# Patient Record
Sex: FEMALE | Race: WHITE | NOT HISPANIC OR LATINO | URBAN - METROPOLITAN AREA
[De-identification: names, ages, dates, MRNs, and addresses within clinical notes are randomized per-mention and may not be internally consistent; named-entity substitution may affect disease eponyms.]

---

## 2019-09-11 ENCOUNTER — APPOINTMENT (RX ONLY)
Dept: URBAN - METROPOLITAN AREA CLINIC 84 | Facility: CLINIC | Age: 84
Setting detail: DERMATOLOGY
End: 2019-09-11

## 2019-09-11 VITALS — WEIGHT: 132 LBS

## 2019-09-11 DIAGNOSIS — L0391 CELLULITIS AND ABSCESS OF UNSPECIFIED SITES: ICD-10-CM

## 2019-09-11 DIAGNOSIS — L21.8 OTHER SEBORRHEIC DERMATITIS: ICD-10-CM

## 2019-09-11 DIAGNOSIS — L0390 CELLULITIS AND ABSCESS OF UNSPECIFIED SITES: ICD-10-CM

## 2019-09-11 PROBLEM — E78.5 HYPERLIPIDEMIA, UNSPECIFIED: Status: ACTIVE | Noted: 2019-09-11

## 2019-09-11 PROBLEM — I10 ESSENTIAL (PRIMARY) HYPERTENSION: Status: ACTIVE | Noted: 2019-09-11

## 2019-09-11 PROBLEM — L02.411 CUTANEOUS ABSCESS OF RIGHT AXILLA: Status: ACTIVE | Noted: 2019-09-11

## 2019-09-11 PROCEDURE — ? COUNSELING

## 2019-09-11 PROCEDURE — ? INTRALESIONAL KENALOG

## 2019-09-11 PROCEDURE — 11900 INJECT SKIN LESIONS </W 7: CPT

## 2019-09-11 PROCEDURE — ? PRESCRIPTION

## 2019-09-11 PROCEDURE — 99202 OFFICE O/P NEW SF 15 MIN: CPT | Mod: 25

## 2019-09-11 PROCEDURE — ? ADDITIONAL NOTES

## 2019-09-11 RX ORDER — KETOCONAZOLE 20.5 MG/ML
SHAMPOO, SUSPENSION TOPICAL
Qty: 1 | Refills: 11 | Status: ERX | COMMUNITY
Start: 2019-09-11

## 2019-09-11 RX ORDER — FLUOCINOLONE ACETONIDE 0.11 MG/ML
OIL TOPICAL
Qty: 1 | Refills: 11 | Status: ERX | COMMUNITY
Start: 2019-09-11

## 2019-09-11 RX ADMIN — FLUOCINOLONE ACETONIDE: 0.11 OIL TOPICAL at 00:00

## 2019-09-11 RX ADMIN — KETOCONAZOLE: 20.5 SHAMPOO, SUSPENSION TOPICAL at 00:00

## 2019-09-11 ASSESSMENT — LOCATION SIMPLE DESCRIPTION DERM
LOCATION SIMPLE: RIGHT AXILLARY VAULT
LOCATION SIMPLE: LEFT SCALP
LOCATION SIMPLE: ANTERIOR SCALP
LOCATION SIMPLE: RIGHT AXILLARY VAULT

## 2019-09-11 ASSESSMENT — LOCATION DETAILED DESCRIPTION DERM
LOCATION DETAILED: RIGHT AXILLARY VAULT
LOCATION DETAILED: LEFT MEDIAL FRONTAL SCALP
LOCATION DETAILED: MID-FRONTAL SCALP
LOCATION DETAILED: RIGHT AXILLARY VAULT

## 2019-09-11 ASSESSMENT — LOCATION ZONE DERM
LOCATION ZONE: AXILLAE
LOCATION ZONE: SCALP
LOCATION ZONE: AXILLAE
LOCATION ZONE: SCALP

## 2019-09-25 ENCOUNTER — APPOINTMENT (RX ONLY)
Dept: URBAN - METROPOLITAN AREA CLINIC 84 | Facility: CLINIC | Age: 84
Setting detail: DERMATOLOGY
End: 2019-09-25

## 2019-09-25 DIAGNOSIS — L0391 CELLULITIS AND ABSCESS OF UNSPECIFIED SITES: ICD-10-CM

## 2019-09-25 DIAGNOSIS — L0390 CELLULITIS AND ABSCESS OF UNSPECIFIED SITES: ICD-10-CM

## 2019-09-25 PROBLEM — L02.411 CUTANEOUS ABSCESS OF RIGHT AXILLA: Status: ACTIVE | Noted: 2019-09-25

## 2019-09-25 PROCEDURE — ? COUNSELING

## 2019-09-25 PROCEDURE — 99213 OFFICE O/P EST LOW 20 MIN: CPT

## 2019-09-25 ASSESSMENT — LOCATION ZONE DERM
LOCATION ZONE: AXILLAE
LOCATION ZONE: AXILLAE

## 2019-09-25 ASSESSMENT — LOCATION DETAILED DESCRIPTION DERM
LOCATION DETAILED: RIGHT AXILLARY VAULT
LOCATION DETAILED: RIGHT AXILLARY VAULT

## 2019-09-25 ASSESSMENT — LOCATION SIMPLE DESCRIPTION DERM
LOCATION SIMPLE: RIGHT AXILLARY VAULT
LOCATION SIMPLE: RIGHT AXILLARY VAULT

## 2019-09-25 ASSESSMENT — SEVERITY ASSESSMENT: SEVERITY: CLEAR

## 2020-01-01 ENCOUNTER — APPOINTMENT (RX ONLY)
Dept: URBAN - METROPOLITAN AREA CLINIC 84 | Facility: CLINIC | Age: 85
Setting detail: DERMATOLOGY
End: 2020-01-01

## 2020-01-01 DIAGNOSIS — L21.8 OTHER SEBORRHEIC DERMATITIS: ICD-10-CM

## 2020-01-01 DIAGNOSIS — L29.89 OTHER PRURITUS: ICD-10-CM

## 2020-01-01 DIAGNOSIS — L85.3 XEROSIS CUTIS: ICD-10-CM

## 2020-01-01 DIAGNOSIS — L28.0 LICHEN SIMPLEX CHRONICUS: ICD-10-CM

## 2020-01-01 DIAGNOSIS — L29.8 OTHER PRURITUS: ICD-10-CM

## 2020-01-01 PROCEDURE — 99213 OFFICE O/P EST LOW 20 MIN: CPT

## 2020-01-01 PROCEDURE — ? ADDITIONAL NOTES

## 2020-01-01 PROCEDURE — ? PRESCRIPTION

## 2020-01-01 PROCEDURE — ? COUNSELING

## 2020-01-01 RX ORDER — CLOBETASOL PROPIONATE 0.5 MG/ML
SOLUTION TOPICAL BID
Qty: 1 | Refills: 1 | Status: ERX | COMMUNITY
Start: 2020-01-01

## 2020-01-01 RX ORDER — KETOCONAZOLE 20 MG/ML
SHAMPOO, SUSPENSION TOPICAL QOD
Qty: 1 | Refills: 5 | Status: ERX

## 2020-01-01 RX ORDER — TRIAMCINOLONE ACETONIDE 1 MG/G
CREAM TOPICAL BID
Qty: 1 | Refills: 1 | Status: ERX | COMMUNITY
Start: 2020-01-01

## 2020-01-01 RX ADMIN — TRIAMCINOLONE ACETONIDE: 1 CREAM TOPICAL at 00:00

## 2020-01-01 RX ADMIN — CLOBETASOL PROPIONATE: 0.5 SOLUTION TOPICAL at 00:00

## 2020-01-01 ASSESSMENT — LOCATION SIMPLE DESCRIPTION DERM
LOCATION SIMPLE: LEFT PRETIBIAL REGION
LOCATION SIMPLE: RIGHT UPPER BACK
LOCATION SIMPLE: RIGHT PRETIBIAL REGION
LOCATION SIMPLE: UPPER BACK
LOCATION SIMPLE: LEFT SHOULDER
LOCATION SIMPLE: RIGHT SHOULDER
LOCATION SIMPLE: ANTERIOR SCALP

## 2020-01-01 ASSESSMENT — LOCATION DETAILED DESCRIPTION DERM
LOCATION DETAILED: LEFT POSTERIOR SHOULDER
LOCATION DETAILED: LEFT PROXIMAL PRETIBIAL REGION
LOCATION DETAILED: RIGHT PROXIMAL PRETIBIAL REGION
LOCATION DETAILED: INFERIOR THORACIC SPINE
LOCATION DETAILED: RIGHT MID-UPPER BACK
LOCATION DETAILED: MID-FRONTAL SCALP
LOCATION DETAILED: RIGHT POSTERIOR SHOULDER

## 2020-01-01 ASSESSMENT — LOCATION ZONE DERM
LOCATION ZONE: SCALP
LOCATION ZONE: ARM
LOCATION ZONE: TRUNK
LOCATION ZONE: LEG

## 2020-09-23 PROBLEM — L85.3 XEROSIS CUTIS: Status: ACTIVE | Noted: 2020-01-01

## 2020-09-23 PROBLEM — L21.8 OTHER SEBORRHEIC DERMATITIS: Status: ACTIVE | Noted: 2020-01-01

## 2020-09-23 PROBLEM — L29.8 OTHER PRURITUS: Status: ACTIVE | Noted: 2020-01-01

## 2020-09-23 PROBLEM — L28.0 LICHEN SIMPLEX CHRONICUS: Status: ACTIVE | Noted: 2020-01-01

## 2020-09-23 NOTE — PROCEDURE: ADDITIONAL NOTES
Detail Level: Simple
Additional Notes: Pt. Instructed to discontinue using rubbing alcohol and avoid scratching.

## 2021-01-01 ENCOUNTER — APPOINTMENT (RX ONLY)
Dept: URBAN - METROPOLITAN AREA CLINIC 352 | Facility: CLINIC | Age: 86
Setting detail: DERMATOLOGY
End: 2021-01-01

## 2021-01-01 ENCOUNTER — RX ONLY (OUTPATIENT)
Age: 86
Setting detail: RX ONLY
End: 2021-01-01

## 2021-01-01 DIAGNOSIS — L29.8 OTHER PRURITUS: ICD-10-CM

## 2021-01-01 DIAGNOSIS — L21.8 OTHER SEBORRHEIC DERMATITIS: ICD-10-CM

## 2021-01-01 DIAGNOSIS — L29.89 OTHER PRURITUS: ICD-10-CM

## 2021-01-01 DIAGNOSIS — Z02.9 ENCOUNTER FOR ADMINISTRATIVE EXAMINATIONS, UNSPECIFIED: ICD-10-CM

## 2021-01-01 DIAGNOSIS — L85.3 XEROSIS CUTIS: ICD-10-CM

## 2021-01-01 PROCEDURE — ? PRESCRIPTION

## 2021-01-01 PROCEDURE — ? COUNSELING

## 2021-01-01 PROCEDURE — ? ADDITIONAL NOTES

## 2021-01-01 PROCEDURE — 99214 OFFICE O/P EST MOD 30 MIN: CPT

## 2021-01-01 PROCEDURE — 99213 OFFICE O/P EST LOW 20 MIN: CPT

## 2021-01-01 RX ORDER — FLUOCINOLONE ACETONIDE 0.11 MG/ML
OIL TOPICAL QHS
Qty: 1 | Refills: 3 | Status: ERX

## 2021-01-01 RX ORDER — TRIAMCINOLONE ACETONIDE 1 MG/G
CREAM TOPICAL
Qty: 1 | Refills: 11 | Status: ERX | COMMUNITY
Start: 2021-01-01

## 2021-01-01 RX ORDER — FLUCONAZOLE 150 MG/1
TABLET ORAL WEEKLY
Qty: 1 | Refills: 0 | Status: ERX | COMMUNITY
Start: 2021-01-01

## 2021-01-01 RX ORDER — CICLOPIROX 10 MG/.96ML
SHAMPOO TOPICAL
Qty: 1 | Refills: 5 | Status: ERX

## 2021-01-01 RX ORDER — FLUOCINOLONE ACETONIDE 0.11 MG/ML
OIL TOPICAL
Qty: 1 | Refills: 3 | Status: ERX | COMMUNITY
Start: 2021-01-01

## 2021-01-01 RX ORDER — KETOCONAZOLE 20 MG/ML
SHAMPOO, SUSPENSION TOPICAL
Qty: 1 | Refills: 6 | Status: ERX | COMMUNITY
Start: 2021-01-01

## 2021-01-01 RX ORDER — CICLOPIROX 10 MG/.96ML
SHAMPOO TOPICAL
Qty: 1 | Refills: 5 | Status: ERX | COMMUNITY
Start: 2021-01-01

## 2021-01-01 RX ADMIN — TRIAMCINOLONE ACETONIDE: 1 CREAM TOPICAL at 00:00

## 2021-01-01 RX ADMIN — CICLOPIROX: 10 SHAMPOO TOPICAL at 00:00

## 2021-01-01 RX ADMIN — FLUOCINOLONE ACETONIDE: 0.11 OIL TOPICAL at 00:00

## 2021-01-01 RX ADMIN — FLUCONAZOLE: 150 TABLET ORAL at 00:00

## 2021-01-01 ASSESSMENT — LOCATION ZONE DERM
LOCATION ZONE: SCALP
LOCATION ZONE: SCALP
LOCATION ZONE: TRUNK

## 2021-01-01 ASSESSMENT — LOCATION DETAILED DESCRIPTION DERM
LOCATION DETAILED: RIGHT INFERIOR MEDIAL MIDBACK
LOCATION DETAILED: LEFT MEDIAL FRONTAL SCALP
LOCATION DETAILED: LEFT MEDIAL FRONTAL SCALP
LOCATION DETAILED: INFERIOR THORACIC SPINE

## 2021-01-01 ASSESSMENT — LOCATION SIMPLE DESCRIPTION DERM
LOCATION SIMPLE: UPPER BACK
LOCATION SIMPLE: RIGHT LOWER BACK
LOCATION SIMPLE: LEFT SCALP
LOCATION SIMPLE: LEFT SCALP

## 2021-03-24 PROBLEM — L29.8 OTHER PRURITUS: Status: ACTIVE | Noted: 2021-01-01

## 2021-03-24 PROBLEM — L85.3 XEROSIS CUTIS: Status: ACTIVE | Noted: 2021-01-01

## 2021-03-24 PROBLEM — L21.8 OTHER SEBORRHEIC DERMATITIS: Status: ACTIVE | Noted: 2021-01-01

## 2021-04-21 PROBLEM — Z02.9 ENCOUNTER FOR ADMINISTRATIVE EXAMINATIONS, UNSPECIFIED: Status: ACTIVE | Noted: 2021-01-01

## 2021-05-19 PROBLEM — L21.8 OTHER SEBORRHEIC DERMATITIS: Status: ACTIVE | Noted: 2021-01-01
